# Patient Record
Sex: MALE | Race: BLACK OR AFRICAN AMERICAN | NOT HISPANIC OR LATINO | Employment: FULL TIME | URBAN - METROPOLITAN AREA
[De-identification: names, ages, dates, MRNs, and addresses within clinical notes are randomized per-mention and may not be internally consistent; named-entity substitution may affect disease eponyms.]

---

## 2017-07-10 ENCOUNTER — HOSPITAL ENCOUNTER (EMERGENCY)
Facility: HOSPITAL | Age: 27
Discharge: HOME | End: 2017-07-10
Attending: EMERGENCY MEDICINE

## 2017-07-10 VITALS
OXYGEN SATURATION: 95 % | WEIGHT: 238 LBS | RESPIRATION RATE: 20 BRPM | BODY MASS INDEX: 29.59 KG/M2 | DIASTOLIC BLOOD PRESSURE: 93 MMHG | HEIGHT: 75 IN | TEMPERATURE: 99 F | HEART RATE: 86 BPM | SYSTOLIC BLOOD PRESSURE: 154 MMHG

## 2017-07-10 DIAGNOSIS — S93.432A SPRAIN OF TIBIOFIBULAR LIGAMENT OF LEFT ANKLE, INITIAL ENCOUNTER: Primary | ICD-10-CM

## 2017-07-10 DIAGNOSIS — M25.572 ANKLE PAIN, LEFT: ICD-10-CM

## 2017-07-10 PROCEDURE — 25000003 PHARM REV CODE 250: Performed by: PHYSICIAN ASSISTANT

## 2017-07-10 PROCEDURE — 99283 EMERGENCY DEPT VISIT LOW MDM: CPT

## 2017-07-10 RX ORDER — GLYBURIDE 5 MG/1
5 TABLET ORAL
COMMUNITY

## 2017-07-10 RX ORDER — IBUPROFEN 400 MG/1
800 TABLET ORAL
Status: COMPLETED | OUTPATIENT
Start: 2017-07-10 | End: 2017-07-10

## 2017-07-10 RX ORDER — NAPROXEN 500 MG/1
500 TABLET ORAL 2 TIMES DAILY WITH MEALS
Qty: 20 TABLET | Refills: 0 | Status: SHIPPED | OUTPATIENT
Start: 2017-07-10

## 2017-07-10 RX ADMIN — IBUPROFEN 800 MG: 400 TABLET, FILM COATED ORAL at 08:07

## 2017-07-11 NOTE — ED TRIAGE NOTES
"left ankle ---- pt states "I think I rolled my ankle today at work. It feels like I fractured it."   "

## 2017-07-11 NOTE — ED PROVIDER NOTES
"Encounter Date: 7/10/2017       History     Chief Complaint   Patient presents with    Ankle Pain     left ankle ---- pt states "I think I rolled my ankle today at work. It feels like I fractured it."      HPI: 25 YO M presents to the ED with a CC of left ankle pain. Patient states that he was at work today and the ground was uneven, he twisted the ankle and now having swelling and pain with ambulation. Patient took Motrin at 11 AM today, denies any other medication. No numbness, no tingling, no other injuries sustained, no abrasions or lacerations. No hx of trauma to the left ankle.          Review of patient's allergies indicates:  No Known Allergies  Past Medical History:   Diagnosis Date    Diabetes mellitus      History reviewed. No pertinent surgical history.  History reviewed. No pertinent family history.  Social History   Substance Use Topics    Smoking status: Never Smoker    Smokeless tobacco: Never Used    Alcohol use Yes      Comment: socially     Review of Systems   Constitutional: Negative for chills and fever.   HENT: Negative for sore throat.    Eyes: Negative for visual disturbance.   Respiratory: Negative for shortness of breath.    Cardiovascular: Negative for chest pain.   Gastrointestinal: Negative for abdominal pain, nausea and vomiting.   Genitourinary: Negative for dysuria.   Musculoskeletal: Positive for arthralgias. Negative for back pain.        Left ankle pain   Skin: Negative for rash.   Neurological: Negative for weakness and numbness.   Hematological: Does not bruise/bleed easily.   All other systems reviewed and are negative.      Physical Exam     Initial Vitals   BP Pulse Resp Temp SpO2   07/10/17 2043 07/10/17 2033 07/10/17 2033 07/10/17 2033 07/10/17 2033   (!) 154/93 86 20 98.6 °F (37 °C) 95 %      MAP       07/10/17 2043       113.33         Physical Exam    Nursing note and vitals reviewed.  Constitutional: He appears well-developed and well-nourished. No distress.   HENT: "   Head: Normocephalic and atraumatic.   Eyes: EOM are normal. Pupils are equal, round, and reactive to light.   Neck: Normal range of motion. Neck supple.   Cardiovascular: Normal rate, regular rhythm, normal heart sounds and intact distal pulses.   Pulmonary/Chest: Breath sounds normal. No respiratory distress.   Musculoskeletal:        Left ankle: He exhibits decreased range of motion and swelling. He exhibits no ecchymosis, no deformity and no laceration. Tenderness. Lateral malleolus tenderness found. No head of 5th metatarsal and no proximal fibula tenderness found.        Feet:    Lymphadenopathy:     He has no cervical adenopathy.   Neurological: He is alert and oriented to person, place, and time. He has normal strength. No sensory deficit.   Skin: Skin is warm. Capillary refill takes less than 2 seconds.         ED Course   Procedures  Labs Reviewed - No data to display          Medical Decision Making:   Initial Assessment:   25 YO M presents to the ED with a CC of left ankle pain. Patient states that he was at work today and the ground was uneven, he twisted the ankle and now having swelling and pain with ambulation. Patient took Motrin at 11 AM today, denies any other medication. No numbness, no tingling, no other injuries sustained, no abrasions or lacerations. No hx of trauma to the left ankle.  Differential Diagnosis:   Ankle fracture, ankle sprain, tendonitis  Clinical Tests:   Radiological Study: Ordered and Reviewed  ED Management:  Symptoms, exam and x-ray support dx of left ankle sprain. Encouraged ACE wrap, NSAIDs, ice and elevation with PCP follow up.  Other:   I discussed test(s) with the performing physician.                   ED Course     Clinical Impression:   The primary encounter diagnosis was Sprain of tibiofibular ligament of left ankle, initial encounter. A diagnosis of Ankle pain, left was also pertinent to this visit.    Disposition:   Disposition: Discharged  Condition:  Stable                        Sindy Cortes PA-C  07/10/17 8250